# Patient Record
(demographics unavailable — no encounter records)

---

## 2025-03-19 NOTE — REVIEW OF SYSTEMS
[Fatigue] : fatigue [Nausea] : nausea [Joint Pain] : joint pain [Joint Stiffness] : joint stiffness [Muscle Pain] : muscle pain [Back Pain] : back pain [Dizziness] : dizziness [Memory Loss] : memory loss [Depression] : depression [Negative] : Heme/Lymph [Suicidal] : not suicidal [Insomnia] : no insomnia [Anxiety] : no anxiety

## 2025-03-19 NOTE — HEALTH RISK ASSESSMENT
[Nearly Every Day (3)] : 4.) Feeling tired or having little energy? Nearly every day [Several Days (1)] : 8.) Moving or speaking so slowly that other people could have noticed, or the opposite, moving or speaking faster than usual? Several days [Not at All (0)] : 9.) Thoughts that you would be off dead or of hurting yourself in some way? Not at all [Mild] : Severity of Depression is Mild [Not at all] : How difficult have these problems made it for you to do your work, take care of things at home, or get along with people? Not at all [PHQ-9 Positive] : PHQ-9 Positive [I have developed a follow-up plan documented below in the note.] : I have developed a follow-up plan documented below in the note. [Former] : Former [> 15 Years] : > 15 Years [[id number]] : 7

## 2025-03-19 NOTE — HISTORY OF PRESENT ILLNESS
[Friend] : friend [Other: _____] : [unfilled] [FreeTextEntry1] : Pt presents with severe pain in legs and lower back, would like to discuss meloxicam, requesting PT referral, c/o dizziness and brain fog, occasional high blood pressure, would like her ears checked for wax  [de-identified] : She feels lightheaded at times.  She has heartburn at times and takes over the counter medication for it and it helps.  She has severe pain in her back and legs. Her caregiver would like to resume meloxicam for her and to resume PT.The blood pressure at home has been in the 140 range systolic.  Her caregiver is asking if the donepezil can be held to see if it's contributing to the dizziness.

## 2025-03-19 NOTE — PLAN
[FreeTextEntry1] : I prescribed meloxicam 7.5 mg bid prn and her caregiver was advised to give her the over the counter PPI with it.  They may hold the donepezil for 2 weeks.  For the stable hypertension she will continue taking amlodipine 5 mg daily.  I reviewed the depression screening and for the stable mild depression she will continue taking quetiapine 25 mg daily and sertraline 50 mg daily.  I ordered PT.

## 2025-03-19 NOTE — PHYSICAL EXAM
[No Acute Distress] : no acute distress [Well Nourished] : well nourished [Well Developed] : well developed [Well-Appearing] : well-appearing [Normal Voice/Communication] : normal voice/communication [No Respiratory Distress] : no respiratory distress  [de-identified] : partial occlusion of ear canals bilaterally, flushed with some success [de-identified] : decreased ROM of lumbar spine [de-identified] : she was forgetful during the visit

## 2025-05-05 NOTE — HISTORY OF PRESENT ILLNESS
[Family Member] : family member [Home] : at home, [unfilled] , at the time of the visit. [Medical Office: ([facility])___] : at the medical office located in  [Telehealth (audio & video)] : This visit was provided via telehealth using real-time 2-way audio visual technology. [FreeTextEntry3] : from her daughter, Ariana [FreeTextEntry1] : Pt presents for a follow up on the confusion and weakness. [de-identified] : Verbal consent given on 05/05/2025 at 17:48 by KIERSTEN MILLS, ~  ~.  She tripped over the comforter on her bed over the weekend and fell. She was evaluated at the ER and the work up was negative.  She never started PT.  Her daughter reports that her legs are becoming weaker and she still has the leg pain. She takes up to 1 meloxicam daily but not every day. She is a little lightheaded at times.  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 Right now she has a bathtub and isn't able to get into it due to her leg pain and weakness.  Her daughter is asking if there's any other medication she can take for the confusion.  She is asking if the quetiapine can be stopped.  She is also having heartburn and is taking over the counter medication but would like a rx.

## 2025-05-05 NOTE — REVIEW OF SYSTEMS
[Joint Pain] : joint pain [Joint Stiffness] : joint stiffness [Back Pain] : back pain [Dizziness] : dizziness [Memory Loss] : memory loss [Unsteady Walking] : ataxia [Negative] : Heme/Lymph [de-identified] : pain and weakness in legs

## 2025-05-05 NOTE — PHYSICAL EXAM
[No Acute Distress] : no acute distress [Well Nourished] : well nourished [Well Developed] : well developed [Well-Appearing] : well-appearing [Normal Mood] : the mood was normal

## 2025-05-05 NOTE — PLAN
[FreeTextEntry1] : I reviewed the ER report including multiple lab and radiology results.  She may D/C the quetiapine.  For the worsening memory loss, she will continue taking donepezil 10 mg daily and I added memantine 10 mg daily.  She will take pepcid 20 mg bid prn acid reflux and may continue taking meloxicam 7.5 mg daily as needed.  She will start PT.  Her daughter will fax me the documentatin she needs to for the increased hours of the HHA and for the walk in shower.